# Patient Record
Sex: MALE | Race: OTHER | Employment: FULL TIME | URBAN - NONMETROPOLITAN AREA
[De-identification: names, ages, dates, MRNs, and addresses within clinical notes are randomized per-mention and may not be internally consistent; named-entity substitution may affect disease eponyms.]

---

## 2021-10-29 ENCOUNTER — HOSPITAL ENCOUNTER (EMERGENCY)
Age: 41
Discharge: HOME | End: 2021-10-29
Attending: EMERGENCY MEDICINE
Payer: COMMERCIAL

## 2021-10-29 VITALS
RESPIRATION RATE: 16 BRPM | DIASTOLIC BLOOD PRESSURE: 105 MMHG | HEART RATE: 64 BPM | OXYGEN SATURATION: 96 % | TEMPERATURE: 98.3 F | SYSTOLIC BLOOD PRESSURE: 170 MMHG

## 2021-10-29 DIAGNOSIS — N20.0 KIDNEY STONE: ICD-10-CM

## 2021-10-29 DIAGNOSIS — N23 RENAL COLIC: Primary | ICD-10-CM

## 2021-10-29 LAB
ALBUMIN SERPL-MCNC: 4.3 G/DL (ref 3.5–5.1)
ALP BLD-CCNC: 80 U/L (ref 38–126)
ALT SERPL-CCNC: 112 U/L (ref 11–66)
ANION GAP SERPL CALCULATED.3IONS-SCNC: 14 MEQ/L (ref 8–16)
AST SERPL-CCNC: 41 U/L (ref 5–40)
BACTERIA: ABNORMAL /HPF
BASOPHILS # BLD: 1.1 %
BASOPHILS ABSOLUTE: 0.1 THOU/MM3 (ref 0–0.1)
BILIRUB SERPL-MCNC: 0.6 MG/DL (ref 0.3–1.2)
BILIRUBIN DIRECT: < 0.2 MG/DL (ref 0–0.3)
BILIRUBIN URINE: NEGATIVE
BLOOD, URINE: ABNORMAL
BUN BLDV-MCNC: 9 MG/DL (ref 7–22)
CALCIUM SERPL-MCNC: 9.5 MG/DL (ref 8.5–10.5)
CASTS 2: ABNORMAL /LPF
CASTS UA: ABNORMAL /LPF
CHARACTER, URINE: CLEAR
CHLORIDE BLD-SCNC: 98 MEQ/L (ref 98–111)
CO2: 22 MEQ/L (ref 23–33)
COLOR: ABNORMAL
CREAT SERPL-MCNC: 0.8 MG/DL (ref 0.4–1.2)
CRYSTALS, UA: ABNORMAL
EOSINOPHIL # BLD: 2.8 %
EOSINOPHILS ABSOLUTE: 0.2 THOU/MM3 (ref 0–0.4)
EPITHELIAL CELLS, UA: ABNORMAL /HPF
ERYTHROCYTE [DISTWIDTH] IN BLOOD BY AUTOMATED COUNT: 12.3 % (ref 11.5–14.5)
ERYTHROCYTE [DISTWIDTH] IN BLOOD BY AUTOMATED COUNT: 42.5 FL (ref 35–45)
GFR SERPL CREATININE-BSD FRML MDRD: > 90 ML/MIN/1.73M2
GLUCOSE BLD-MCNC: 101 MG/DL (ref 70–108)
GLUCOSE URINE: NEGATIVE MG/DL
HCT VFR BLD CALC: 48.8 % (ref 42–52)
HEMOGLOBIN: 16.5 GM/DL (ref 14–18)
IMMATURE GRANS (ABS): 0.05 THOU/MM3 (ref 0–0.07)
IMMATURE GRANULOCYTES: 0.6 %
KETONES, URINE: NEGATIVE
LEUKOCYTE ESTERASE, URINE: NEGATIVE
LIPASE: 41.9 U/L (ref 5.6–51.3)
LYMPHOCYTES # BLD: 27 %
LYMPHOCYTES ABSOLUTE: 2.2 THOU/MM3 (ref 1–4.8)
MCH RBC QN AUTO: 31.5 PG (ref 26–33)
MCHC RBC AUTO-ENTMCNC: 33.8 GM/DL (ref 32.2–35.5)
MCV RBC AUTO: 93.3 FL (ref 80–94)
MISCELLANEOUS 2: ABNORMAL
MONOCYTES # BLD: 10.3 %
MONOCYTES ABSOLUTE: 0.9 THOU/MM3 (ref 0.4–1.3)
NITRITE, URINE: NEGATIVE
NUCLEATED RED BLOOD CELLS: 0 /100 WBC
OSMOLALITY CALCULATION: 267.1 MOSMOL/KG (ref 275–300)
PH UA: 7 (ref 5–9)
PLATELET # BLD: 268 THOU/MM3 (ref 130–400)
PMV BLD AUTO: 10.6 FL (ref 9.4–12.4)
POTASSIUM SERPL-SCNC: 3.8 MEQ/L (ref 3.5–5.2)
PROTEIN UA: ABNORMAL
RBC # BLD: 5.23 MILL/MM3 (ref 4.7–6.1)
RBC URINE: > 100 /HPF
RENAL EPITHELIAL, UA: ABNORMAL
SEG NEUTROPHILS: 58.2 %
SEGMENTED NEUTROPHILS ABSOLUTE COUNT: 4.8 THOU/MM3 (ref 1.8–7.7)
SODIUM BLD-SCNC: 134 MEQ/L (ref 135–145)
SPECIFIC GRAVITY, URINE: 1.01 (ref 1–1.03)
TOTAL PROTEIN: 7.6 G/DL (ref 6.1–8)
UROBILINOGEN, URINE: 0.2 EU/DL (ref 0–1)
WBC # BLD: 8.3 THOU/MM3 (ref 4.8–10.8)
WBC UA: ABNORMAL /HPF
YEAST: ABNORMAL

## 2021-10-29 PROCEDURE — 83690 ASSAY OF LIPASE: CPT

## 2021-10-29 PROCEDURE — 81001 URINALYSIS AUTO W/SCOPE: CPT

## 2021-10-29 PROCEDURE — 99282 EMERGENCY DEPT VISIT SF MDM: CPT

## 2021-10-29 PROCEDURE — 80053 COMPREHEN METABOLIC PANEL: CPT

## 2021-10-29 PROCEDURE — 85025 COMPLETE CBC W/AUTO DIFF WBC: CPT

## 2021-10-29 PROCEDURE — 82248 BILIRUBIN DIRECT: CPT

## 2021-10-29 PROCEDURE — 36415 COLL VENOUS BLD VENIPUNCTURE: CPT

## 2021-10-29 RX ORDER — CIPROFLOXACIN 500 MG/1
500 TABLET, FILM COATED ORAL 2 TIMES DAILY
Qty: 14 TABLET | Refills: 0 | Status: SHIPPED | OUTPATIENT
Start: 2021-10-29 | End: 2021-11-05

## 2021-10-29 ASSESSMENT — ENCOUNTER SYMPTOMS
SHORTNESS OF BREATH: 0
NAUSEA: 0
WHEEZING: 0
RHINORRHEA: 0
TROUBLE SWALLOWING: 0
VOICE CHANGE: 0
CHEST TIGHTNESS: 0
BACK PAIN: 1
VOMITING: 0
COUGH: 0
SORE THROAT: 0
SINUS PRESSURE: 0
DIARRHEA: 0
ABDOMINAL PAIN: 1
CONSTIPATION: 0

## 2021-10-29 ASSESSMENT — PAIN SCALES - GENERAL: PAINLEVEL_OUTOF10: 6

## 2021-10-29 ASSESSMENT — PAIN DESCRIPTION - LOCATION: LOCATION: ABDOMEN

## 2021-10-29 ASSESSMENT — PAIN DESCRIPTION - PAIN TYPE: TYPE: ACUTE PAIN

## 2021-10-29 NOTE — LETTER
[facility] [address] [address] EMERGENCY DEPT  [address] [place] [id number]  Phone: [phone]             October 29, 2021    Patient: Doe Jones   YOB: 1980   Date of Visit: 10/29/2021       To Whom It May Concern:    Heraclio Benito was seen and treated in our emergency department on 10/29/2021. He may return to work on 10/30/21.       Sincerely,             Signature:__________________________________

## 2021-10-29 NOTE — ED TRIAGE NOTES
Pt presents to the ED via lobby with c/o dysuria. Pt states that he had recent kidney stones and is still having abd pain. Pt states that today he had difficulty urinating more than small amounts at a time.

## 2021-10-29 NOTE — ED PROVIDER NOTES
[facility]        Room # 42/927A    CHIEF COMPLAINT    Chief Complaint   Patient presents with    Dysuria     recent kidney stones       Nurses Notes reviewed and I agree except as noted in the HPI. HPI    Pepe Richard is a 38 y.o. male who presents for evaluation of frequency, urgency and pain on the lower abdomen and flank area since this morning. The patient however had a history of kidney stone in the past.  Denies any fever, no chills no shortness of breath or chest pain or nausea or vomiting. The patient while in the emergency room, the patient passed a stone which he was able to catch into the urine cup. REVIEW OF SYSTEMS    Review of Systems   Constitutional: Negative for appetite change, chills, diaphoresis, fatigue and fever. HENT: Negative for congestion, ear discharge, ear pain, postnasal drip, rhinorrhea, sinus pressure, sore throat, trouble swallowing and voice change. Respiratory: Negative for cough, chest tightness, shortness of breath and wheezing. Cardiovascular: Negative for chest pain, palpitations and leg swelling. Gastrointestinal: Positive for abdominal pain. Negative for constipation, diarrhea, nausea and vomiting. Genitourinary: Positive for frequency, penile pain and urgency. Musculoskeletal: Positive for back pain. Negative for arthralgias, joint swelling, myalgias, neck pain and neck stiffness. Skin: Negative for rash. Neurological: Negative for dizziness, syncope, weakness, light-headedness, numbness and headaches. PAST MEDICAL HISTORY     has no past medical history on file. SURGICAL HISTORY   has no past surgical history on file. CURRENT MEDICATIONS    Previous Medications    No medications on file       ALLERGIES    has No Known Allergies. FAMILY HISTORY    has no family status information on file. family history is not on file.     SOCIAL HISTORY reports that he has never smoked. He has never used smokeless tobacco. He reports current alcohol use. He reports that he does not use drugs. PHYSICAL EXAM      INITIAL VITALS: BP (!) 170/105   Pulse 64   Temp 98.3 °F (36.8 °C) (Oral)   Resp 16   SpO2 96% There is no height or weight on file to calculate BMI. Physical Exam  Vitals reviewed. Constitutional:       Appearance: He is well-developed. HENT:      Head: Normocephalic and atraumatic. Right Ear: External ear normal.      Left Ear: External ear normal.      Nose: Nose normal.   Eyes:      General: No scleral icterus. Conjunctiva/sclera: Conjunctivae normal.      Pupils: Pupils are equal, round, and reactive to light. Neck:      Thyroid: No thyromegaly. Vascular: No JVD. Cardiovascular:      Rate and Rhythm: Normal rate and regular rhythm. Heart sounds: No murmur heard. No friction rub. Pulmonary:      Effort: Pulmonary effort is normal.      Breath sounds: Normal breath sounds. No wheezing or rales. Chest:      Chest wall: No tenderness. Abdominal:      General: Bowel sounds are normal.      Palpations: Abdomen is soft. There is no mass. Tenderness: There is no abdominal tenderness. Musculoskeletal:      Cervical back: Normal range of motion and neck supple. Lymphadenopathy:      Cervical: No cervical adenopathy. Skin:     Findings: No rash. Neurological:      Mental Status: He is alert and oriented to person, place, and time. Psychiatric:         Behavior: Behavior is cooperative.          MEDICAL DECISION MAKING    DIFFERENTIAL DIAGNOSIS:  UTI pyelonephritis kidney stone      DIAGNOSTIC RESULTS      RADIOLOGY:    No orders to display       LABS:   Labs Reviewed   BASIC METABOLIC PANEL - Abnormal; Notable for the following components:       Result Value    Sodium 134 (*)     CO2 22 (*)     All other components within normal limits   HEPATIC FUNCTION PANEL - Abnormal; Notable for the following occasionally words are mis-transcribed . The transcription may contain errors not detected in proofreading.   This transcription was electronically signed.)     10/29/21 8:01 PM      Rema Rock MD      Emergency room physician           Rema Rock MD  10/29/21 2001       Rema Rock MD  10/30/21 9250

## 2022-07-07 ENCOUNTER — OFFICE VISIT (OUTPATIENT)
Dept: INTERNAL MEDICINE CLINIC | Age: 42
End: 2022-07-07
Payer: COMMERCIAL

## 2022-07-07 VITALS
HEART RATE: 58 BPM | BODY MASS INDEX: 38.76 KG/M2 | DIASTOLIC BLOOD PRESSURE: 76 MMHG | HEIGHT: 64 IN | WEIGHT: 227 LBS | SYSTOLIC BLOOD PRESSURE: 138 MMHG | OXYGEN SATURATION: 99 %

## 2022-07-07 DIAGNOSIS — S93.401A SPRAIN OF RIGHT ANKLE, UNSPECIFIED LIGAMENT, INITIAL ENCOUNTER: Primary | ICD-10-CM

## 2022-07-07 PROCEDURE — 4004F PT TOBACCO SCREEN RCVD TLK: CPT | Performed by: PHYSICIAN ASSISTANT

## 2022-07-07 PROCEDURE — G8428 CUR MEDS NOT DOCUMENT: HCPCS | Performed by: PHYSICIAN ASSISTANT

## 2022-07-07 PROCEDURE — 99213 OFFICE O/P EST LOW 20 MIN: CPT | Performed by: PHYSICIAN ASSISTANT

## 2022-07-07 PROCEDURE — G8417 CALC BMI ABV UP PARAM F/U: HCPCS | Performed by: PHYSICIAN ASSISTANT

## 2022-07-07 RX ORDER — IBUPROFEN 600 MG/1
600 TABLET ORAL 3 TIMES DAILY PRN
Qty: 90 TABLET | Refills: 0 | Status: SHIPPED | OUTPATIENT
Start: 2022-07-07

## 2022-07-07 ASSESSMENT — ENCOUNTER SYMPTOMS
DIARRHEA: 0
EYE REDNESS: 0
CONSTIPATION: 0
ABDOMINAL PAIN: 0
PHOTOPHOBIA: 0
EYE PAIN: 0
WHEEZING: 0
RHINORRHEA: 0
SHORTNESS OF BREATH: 0
COLOR CHANGE: 0
VOMITING: 0
COUGH: 0
NAUSEA: 0
SORE THROAT: 0
BACK PAIN: 0
EYE DISCHARGE: 0
BLOOD IN STOOL: 0
CHEST TIGHTNESS: 0

## 2022-07-07 NOTE — PROGRESS NOTES
Isadora Tapia (:  1980) is a 42 y.o. male,Established patient, here for evaluation of the following chief complaint(s):    Pain    This is my first patient encounter with Isadora Tapia; chart reviewed. SUBJECTIVE/OBJECTIVE:  HPI  Isadora Tapia is a pleasant 42 y.o. male presenting to clinic today for ankle pain. 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 Patient reports he told his employer however nothing was done about this. Patient reports that he has been off for the past week due to holiday; patient states he has been using supportive care strategies without much benefit; patient reports he has noticed some improvement in swelling and pain however symptoms are persistent. Patient reports he is able to ambulate now whereas the first day or 2 it was almost intolerable. Patient denies overlying warmth or erythema. Current Outpatient Medications   Medication Sig Dispense Refill    ibuprofen (ADVIL;MOTRIN) 600 MG tablet Take 1 tablet by mouth 3 times daily as needed for Pain 90 tablet 0     No current facility-administered medications for this visit. Review of Systems   Constitutional: Negative for appetite change, chills, fatigue and fever. HENT: Negative for congestion, ear pain, hearing loss, rhinorrhea and sore throat. Eyes: Negative for photophobia, pain, discharge and redness. Respiratory: Negative for cough, chest tightness, shortness of breath and wheezing. Cardiovascular: Negative for chest pain, palpitations and leg swelling. Gastrointestinal: Negative for abdominal pain, blood in stool, constipation, diarrhea, nausea and vomiting. Endocrine: Negative for polyuria. Genitourinary: Negative for difficulty urinating, dysuria, flank pain, frequency, hematuria and urgency. Musculoskeletal: Positive for arthralgias and gait problem. Negative for back pain and joint swelling. Skin: Negative for color change and rash. Neurological: Negative for dizziness, syncope, weakness, light-headedness and headaches. Hematological: Negative for adenopathy. Psychiatric/Behavioral: Negative for agitation, behavioral problems and suicidal ideas. The patient is not nervous/anxious. Physical Exam  HENT:      Head: Normocephalic and atraumatic. Right Ear: External ear normal.      Left Ear: External ear normal.   Cardiovascular:      Rate and Rhythm: Regular rhythm. Pulses: Normal pulses. Pulmonary:      Effort: No respiratory distress. Musculoskeletal:         General: Normal range of motion. Comments: Anterolateral right ankle is swollen and mildly tender to palpation; no malleoli point tenderness; patient is able to ambulate with a limp; 4-5 strength with ankle dorsiflexion and plantar flexion; distal sensation, motor function, capillary refill intact etc.   Skin:     General: Skin is warm and dry. Neurological:      General: No focal deficit present. Mental Status: He is alert and oriented to person, place, and time. Mental status is at baseline. Psychiatric:         Behavior: Behavior normal.         ASSESSMENT/PLAN:  1.  Sprain of right ankle, unspecified ligament, initial encounter   -Physical exam is consistent with right ankle sprain of ATFL ligament; based on symptoms and improvement, no clear indication for x-rays at this time; discussed supportive care strategies, anti-inflammatories; importance of compression and ankle brace for stability and prevention of injury worsening etc.; work note provided for patient to remain on light duty if available however advised patient that he should discuss this further with his employer/occupational health provider as this did occur at work. Return to clinic in 1 to 2 weeks if needing further clearance or symptoms are persistent. -     ibuprofen (ADVIL;MOTRIN) 600 MG tablet; Take 1 tablet by mouth 3 times daily as needed for Pain, Disp-90 tablet, R-0Normal      Return in about 1 week (around 7/14/2022), or if symptoms worsen or fail to improve, for Follow Up. An electronic signature was used to authenticate this note.     --YESIKA Sawant

## 2022-07-07 NOTE — LETTER
[address] [place] [address] Internal Med  [address] [place] [address]  Phone: [phone]  Fax: [phone]    Danielle Cooper PA-C        July 7, 2022     Patient: Miryam Novak   YOB: 1980   Date of Visit: 7/7/2022       To Whom It May Concern: It is my medical opinion that Shahla Epperson Was seen in clinic today for right ankle sprain; patient should perform light duty work activities for next 5-7 days or until fully cleared; patient's initial injury occurred at work; patient may benefit from occupational health evaluation. If you have any questions or concerns, please don't hesitate to call.     Sincerely,        Danielle Cooper PA-C

## 2023-03-06 ENCOUNTER — APPOINTMENT (OUTPATIENT)
Dept: GENERAL RADIOLOGY | Age: 43
End: 2023-03-06

## 2023-03-06 ENCOUNTER — HOSPITAL ENCOUNTER (EMERGENCY)
Age: 43
Discharge: HOME | End: 2023-03-06
Attending: EMERGENCY MEDICINE

## 2023-03-06 VITALS
OXYGEN SATURATION: 99 % | DIASTOLIC BLOOD PRESSURE: 83 MMHG | SYSTOLIC BLOOD PRESSURE: 140 MMHG | RESPIRATION RATE: 18 BRPM | HEIGHT: 64 IN | WEIGHT: 200 LBS | BODY MASS INDEX: 34.15 KG/M2 | HEART RATE: 66 BPM | TEMPERATURE: 97.7 F

## 2023-03-06 DIAGNOSIS — M70.42 PREPATELLAR BURSITIS OF LEFT KNEE: Primary | ICD-10-CM

## 2023-03-06 DIAGNOSIS — M25.562 ACUTE PAIN OF LEFT KNEE: ICD-10-CM

## 2023-03-06 PROCEDURE — 6370000000 HC RX 637 (ALT 250 FOR IP): Performed by: EMERGENCY MEDICINE

## 2023-03-06 PROCEDURE — 96372 THER/PROPH/DIAG INJ SC/IM: CPT

## 2023-03-06 PROCEDURE — 6360000002 HC RX W HCPCS: Performed by: EMERGENCY MEDICINE

## 2023-03-06 PROCEDURE — 73562 X-RAY EXAM OF KNEE 3: CPT

## 2023-03-06 PROCEDURE — 99284 EMERGENCY DEPT VISIT MOD MDM: CPT

## 2023-03-06 RX ORDER — KETOROLAC TROMETHAMINE 30 MG/ML
30 INJECTION, SOLUTION INTRAMUSCULAR; INTRAVENOUS ONCE
Status: COMPLETED | OUTPATIENT
Start: 2023-03-06 | End: 2023-03-06

## 2023-03-06 RX ORDER — OXYCODONE HYDROCHLORIDE 5 MG/1
5 TABLET ORAL EVERY 6 HOURS PRN
Qty: 12 TABLET | Refills: 0 | Status: SHIPPED | OUTPATIENT
Start: 2023-03-06 | End: 2023-03-09

## 2023-03-06 RX ORDER — HYDROCODONE BITARTRATE AND ACETAMINOPHEN 5; 325 MG/1; MG/1
2 TABLET ORAL ONCE
Status: COMPLETED | OUTPATIENT
Start: 2023-03-06 | End: 2023-03-06

## 2023-03-06 RX ORDER — KETOROLAC TROMETHAMINE 10 MG/1
10 TABLET, FILM COATED ORAL EVERY 8 HOURS PRN
Qty: 12 TABLET | Refills: 0 | Status: SHIPPED | OUTPATIENT
Start: 2023-03-06 | End: 2023-03-09

## 2023-03-06 RX ADMIN — HYDROCODONE BITARTRATE AND ACETAMINOPHEN 2 TABLET: 5; 325 TABLET ORAL at 20:27

## 2023-03-06 RX ADMIN — KETOROLAC TROMETHAMINE 30 MG: 30 INJECTION, SOLUTION INTRAMUSCULAR; INTRAVENOUS at 20:36

## 2023-03-06 ASSESSMENT — PAIN SCALES - GENERAL: PAINLEVEL_OUTOF10: 8

## 2023-03-06 ASSESSMENT — ENCOUNTER SYMPTOMS
RESPIRATORY NEGATIVE: 1
EYES NEGATIVE: 1
GASTROINTESTINAL NEGATIVE: 1
BACK PAIN: 0

## 2023-03-06 NOTE — Clinical Note
Ivy Florence was seen and treated in our emergency department on 3/6/2023. He may return to work on 03/09/2023. If you have any questions or concerns, please don't hesitate to call.       Trisha Howell, DO

## 2023-03-07 NOTE — ED NOTES
Discharge instructions reviewed and pt acknowledges understanding. Ambulatory at discharge.       Angel Hill RN  03/06/23 2057

## 2023-03-07 NOTE — ED PROVIDER NOTES
The history is provided by the patient. Knee Problem  Pain details:     Quality:  Burning and throbbing    Radiates to:  Does not radiate    Severity:  Moderate    Onset quality:  Gradual    Duration:  2 days    Timing:  Constant    Progression:  Worsening  Chronicity:  New  Dislocation: no    Foreign body present:  No foreign bodies  Tetanus status:  Up to date  Prior injury to area:  No  Relieved by:  Rest and ice  Worsened by:  Bearing weight and activity  Ineffective treatments:  NSAIDs  Associated symptoms: decreased ROM and swelling    Associated symptoms: no back pain, no fatigue, no fever, no itching, no muscle weakness, no neck pain, no numbness, no stiffness and no tingling    Associated symptoms comment:  Patient noticed he had some swelling around the kneecap which is also red. Patient had similar episode in his left ankle around his Achilles tendon a few months ago. This episode appeared to resolve with just utilization of Motrin. He has not had any direct trauma to the area there is no open wounds or sores there is been no puncture wounds to the skin or into the knee itself and the patient has no history of gout. The patient does work as a  and is up and down on his knees quite a bit he does a lot of bending and stooping as well at work    Review of Systems   Constitutional: Negative. Negative for fatigue and fever. HENT: Negative. Eyes: Negative. Respiratory: Negative. Cardiovascular: Negative. Gastrointestinal: Negative. Genitourinary: Negative. Musculoskeletal: Negative. Negative for back pain, neck pain and stiffness. Skin: Negative. Negative for itching. Neurological: Negative. All other systems reviewed and are negative. History reviewed. No pertinent family history.   Social History     Socioeconomic History    Marital status:      Spouse name: Not on file    Number of children: Not on file    Years of education: Not on file    Highest education level: Not on file   Occupational History    Not on file   Tobacco Use    Smoking status: Never    Smokeless tobacco: Never   Substance and Sexual Activity    Alcohol use: Yes    Drug use: No    Sexual activity: Not on file   Other Topics Concern    Not on file   Social History Narrative    Not on file     Social Determinants of Health     Financial Resource Strain: Not on file   Food Insecurity: Not on file   Transportation Needs: Not on file   Physical Activity: Not on file   Stress: Not on file   Social Connections: Not on file   Intimate Partner Violence: Not on file   Housing Stability: Not on file     History reviewed. No pertinent surgical history. History reviewed. No pertinent past medical history. No Known Allergies  Prior to Admission medications    Medication Sig Start Date End Date Taking? Authorizing Provider   ketorolac (TORADOL) 10 MG tablet Take 1 tablet by mouth every 8 hours as needed for Pain 3/6/23 3/9/23 Yes Nacho Dowd DO   oxyCODONE (ROXICODONE) 5 MG immediate release tablet Take 1 tablet by mouth every 6 hours as needed for Pain for up to 3 days. Intended supply: 3 days. Take lowest dose possible to manage pain Max Daily Amount: 20 mg 3/6/23 3/9/23 Yes Nacho Dowd DO   ibuprofen (ADVIL;MOTRIN) 600 MG tablet Take 1 tablet by mouth 3 times daily as needed for Pain 7/7/22   YESIKA Fam       BP (!) 140/83   Pulse 66   Temp 97.7 °F (36.5 °C) (Oral)   Resp 18   Ht 5' 4\" (1.626 m)   Wt 200 lb (90.7 kg)   SpO2 99%   BMI 34.33 kg/m²     Physical Exam  Constitutional:       Appearance: He is well-developed. HENT:      Head: Normocephalic and atraumatic. Right Ear: External ear normal.      Left Ear: External ear normal.      Nose: Nose normal.   Eyes:      Conjunctiva/sclera: Conjunctivae normal.      Pupils: Pupils are equal, round, and reactive to light. Cardiovascular:      Rate and Rhythm: Normal rate and regular rhythm.       Heart sounds: Normal heart sounds. Pulmonary:      Effort: Pulmonary effort is normal.      Breath sounds: Normal breath sounds. Abdominal:      General: Bowel sounds are normal.      Palpations: Abdomen is soft. Musculoskeletal:      Cervical back: Normal range of motion and neck supple. Comments: Erythema and redness noted around the prepatellar bursa and around kneecap there is no joint involvement patient has normal range of motion but is somewhat painful. Swelling and redness isolated to bursa. No streaking into the leg and no evidence of cellulitis or infection   Skin:     General: Skin is warm and dry. Neurological:      Mental Status: He is alert and oriented to person, place, and time. GCS: GCS eye subscore is 4. GCS verbal subscore is 5. GCS motor subscore is 6. Psychiatric:         Behavior: Behavior normal.         Thought Content: Thought content normal.         Judgment: Judgment normal.       MDM:    Labs Reviewed - No data to display    CC/HPI Summary, DDx, ED Course, and Reassessment: Patient does not have any signs that this represents anything infectious. He is not an IV drug user it is all prepatellar and does not actually involve the knee joint itself. I do not believe that aspiration is necessary, warranted and may be detrimental if this does represent a bursitis as the area made continue to drain and then become infected or cause issues. He has no clinical evidence to support the fact that this is infectious etiology whatsoever. The patient was given shot of Toradol as well as ice in the emergency department he did feel somewhat improved. I would continue the patient with oral Toradol and I have given him medication for breakthrough pain. The patient was also given 2 Norco which seem to help as well. I have given the patient a prescription for Roxicodone 5 mg he has been given a 3-day supply.   I selected this pain medication due to the inadequacies of local pharmacies in order to stock other pain medication. I believe this will benefit the patient especially if he does continue to work we also discussed knee health as well as possibility of a neoprene knee sleeve in order to help alleviate some of the pain as well. I also referred the patient over for orthopedics for evaluation if this continues    History from : Patient    Limitations to history : None    Patient was given the following medications:  Medications   ketorolac (TORADOL) injection 30 mg (30 mg IntraMUSCular Given 3/6/23 2036)   HYDROcodone-acetaminophen (NORCO) 5-325 MG per tablet 2 tablet (2 tablets Oral Given 3/6/23 2027)       Independent Imaging Interpretation by Radiology  XR KNEE LEFT (3 VIEWS)   Preliminary Result   Soft tissue swelling anterior to the patella in the knee. No acute fracture   dislocation or knee effusion. No evidence of degenerative changes. Discussion with Other Profesionals : None    Social Determinants : None    Records Reviewed : None    Disposition   Appropriate for outpatient management      I am the Primary Clinician of Record. Final Impression    1. Prepatellar bursitis of left knee    2.  Acute pain of left knee              [address], DO  03/06/23 4964